# Patient Record
Sex: MALE
[De-identification: names, ages, dates, MRNs, and addresses within clinical notes are randomized per-mention and may not be internally consistent; named-entity substitution may affect disease eponyms.]

---

## 2024-08-03 PROBLEM — Z00.00 ENCOUNTER FOR PREVENTIVE HEALTH EXAMINATION: Status: ACTIVE | Noted: 2024-08-03

## 2024-08-15 ENCOUNTER — APPOINTMENT (OUTPATIENT)
Dept: ORTHOPEDIC SURGERY | Facility: CLINIC | Age: 29
End: 2024-08-15

## 2024-08-15 VITALS
HEIGHT: 75 IN | OXYGEN SATURATION: 99 % | HEART RATE: 86 BPM | WEIGHT: 172 LBS | TEMPERATURE: 98.2 F | SYSTOLIC BLOOD PRESSURE: 121 MMHG | DIASTOLIC BLOOD PRESSURE: 78 MMHG | BODY MASS INDEX: 21.39 KG/M2

## 2024-08-15 DIAGNOSIS — Z78.9 OTHER SPECIFIED HEALTH STATUS: ICD-10-CM

## 2024-08-15 DIAGNOSIS — M51.36 OTHER INTERVERTEBRAL DISC DEGENERATION, LUMBAR REGION: ICD-10-CM

## 2024-08-15 DIAGNOSIS — M51.26 OTHER INTERVERTEBRAL DISC DISPLACEMENT, LUMBAR REGION: ICD-10-CM

## 2024-08-15 DIAGNOSIS — Z60.2 PROBLEMS RELATED TO LIVING ALONE: ICD-10-CM

## 2024-08-15 DIAGNOSIS — Z82.49 FAMILY HISTORY OF ISCHEMIC HEART DISEASE AND OTHER DISEASES OF THE CIRCULATORY SYSTEM: ICD-10-CM

## 2024-08-15 PROCEDURE — 72100 X-RAY EXAM L-S SPINE 2/3 VWS: CPT

## 2024-08-15 PROCEDURE — 99204 OFFICE O/P NEW MOD 45 MIN: CPT

## 2024-08-15 RX ORDER — CLOMIPHENE CITRATE 50 MG/1
50 TABLET ORAL
Refills: 0 | Status: ACTIVE | COMMUNITY

## 2024-08-15 RX ORDER — TESTOSTERONE 20.25 MG/1.25G
GEL, METERED TRANSDERMAL
Refills: 0 | Status: ACTIVE | COMMUNITY

## 2024-08-15 RX ORDER — PROGESTERONE 100 MG/1
100 CAPSULE ORAL
Refills: 0 | Status: ACTIVE | COMMUNITY

## 2024-08-15 SDOH — SOCIAL STABILITY - SOCIAL INSECURITY: PROBLEMS RELATED TO LIVING ALONE: Z60.2

## 2024-08-23 ENCOUNTER — OUTPATIENT (OUTPATIENT)
Dept: OUTPATIENT SERVICES | Facility: HOSPITAL | Age: 29
LOS: 1 days | End: 2024-08-23

## 2024-08-23 ENCOUNTER — APPOINTMENT (OUTPATIENT)
Dept: MRI IMAGING | Facility: CLINIC | Age: 29
End: 2024-08-23
Payer: COMMERCIAL

## 2024-08-23 PROCEDURE — 72148 MRI LUMBAR SPINE W/O DYE: CPT | Mod: 26

## 2024-08-23 NOTE — HISTORY OF PRESENT ILLNESS
[de-identified] : Mr. NELSON is a very pleasant 29 year old male who complains of  low back pain over the past 8 years, with progression over the past year. Symptoms are intermittent and are exacerbated with exercise and prolonged sitting. Pain occasionally radiates to bilateral left > right thighs and occasionally to his calves. No lower extremity numbness/tingling. He can go several days without any pain. He does have 1-2 episodes/year where he is bed bound for 5-7 days. He is constantly modifying exercises and daily activities to avoid exacerbations of pain. He has taken oral NSAIDs and tylenol with temporary relief.

## 2024-08-23 NOTE — PHYSICAL EXAM
[de-identified] : NVI bilateral lower extremities TTP midline lumbar region no pain with extension or lateral flexion pain with flexion > 20 degrees no pain with hip ROM negative SLR bilaterally [de-identified] : XR ap/lateral lumbar 8/15/24: L5/S1 degenerative disc disease, retrolisthesis  MRI lumbar spine without contrast 12/2023: L5/S1 degenerative disc disease, L5/S1 disc herniation with right sided sequestered fragment

## 2024-08-23 NOTE — END OF VISIT
[Time Spent: ___ minutes] : I have spent [unfilled] minutes of time on the encounter. [FreeTextEntry3] :   This note was written by Barbara Valera PA-C, acting as a scribe for Dr. Frank Schwab.

## 2024-08-23 NOTE — PHYSICAL EXAM
[de-identified] : NVI bilateral lower extremities TTP midline lumbar region no pain with extension or lateral flexion pain with flexion > 20 degrees no pain with hip ROM negative SLR bilaterally [de-identified] : XR ap/lateral lumbar 8/15/24: L5/S1 degenerative disc disease, retrolisthesis  MRI lumbar spine without contrast 12/2023: L5/S1 degenerative disc disease, L5/S1 disc herniation with right sided sequestered fragment

## 2024-08-23 NOTE — DISCUSSION/SUMMARY
[de-identified] :  A lengthy discussion was held with the patient regarding his condition. I am recommending an updated MRI lumbar spine without contrast and an order was given. He will follow up after imaging has been completed for review. The patient's questions were sought and answered satisfactorily.

## 2024-08-23 NOTE — DISCUSSION/SUMMARY
[de-identified] :  A lengthy discussion was held with the patient regarding his condition. I am recommending an updated MRI lumbar spine without contrast and an order was given. He will follow up after imaging has been completed for review. The patient's questions were sought and answered satisfactorily.

## 2024-08-23 NOTE — HISTORY OF PRESENT ILLNESS
[de-identified] : Mr. NELSON is a very pleasant 29 year old male who complains of  low back pain over the past 8 years, with progression over the past year. Symptoms are intermittent and are exacerbated with exercise and prolonged sitting. Pain occasionally radiates to bilateral left > right thighs and occasionally to his calves. No lower extremity numbness/tingling. He can go several days without any pain. He does have 1-2 episodes/year where he is bed bound for 5-7 days. He is constantly modifying exercises and daily activities to avoid exacerbations of pain. He has taken oral NSAIDs and tylenol with temporary relief.

## 2024-08-29 ENCOUNTER — APPOINTMENT (OUTPATIENT)
Dept: OTOLARYNGOLOGY | Facility: CLINIC | Age: 29
End: 2024-08-29

## 2024-09-06 ENCOUNTER — TRANSCRIPTION ENCOUNTER (OUTPATIENT)
Age: 29
End: 2024-09-06

## 2024-09-08 ENCOUNTER — NON-APPOINTMENT (OUTPATIENT)
Age: 29
End: 2024-09-08